# Patient Record
(demographics unavailable — no encounter records)

---

## 2024-12-18 NOTE — HISTORY OF PRESENT ILLNESS
[de-identified] : INTERMITTENT FEVER, COUGH, AND POSTNASAL DRIP X2 WEEKS. [FreeTextEntry6] : Nasal congestion, cough and fever x3 days.  Normal UOP. Tolerating po intake.  Went to urgent care yesterday - Diagnosed with viral illness.